# Patient Record
Sex: MALE | Employment: FULL TIME | URBAN - METROPOLITAN AREA
[De-identification: names, ages, dates, MRNs, and addresses within clinical notes are randomized per-mention and may not be internally consistent; named-entity substitution may affect disease eponyms.]

---

## 2017-07-10 PROCEDURE — 87070 CULTURE OTHR SPECIMN AEROBIC: CPT

## 2017-07-10 PROCEDURE — 87147 CULTURE TYPE IMMUNOLOGIC: CPT

## 2017-07-10 PROCEDURE — 87205 SMEAR GRAM STAIN: CPT

## 2017-07-10 PROCEDURE — 87186 SC STD MICRODIL/AGAR DIL: CPT

## 2018-04-30 ENCOUNTER — TELEPHONE (OUTPATIENT)
Dept: FAMILY MEDICINE CLINIC | Facility: CLINIC | Age: 23
End: 2018-04-30

## 2018-04-30 NOTE — TELEPHONE ENCOUNTER
Physical on 5/24/18. Did you want to order labs? [facility]. ...FYI. Bennie Goldman  pt is away at school and will NOT be able to have drawn til afterwards

## 2018-05-21 ENCOUNTER — OFFICE VISIT (OUTPATIENT)
Dept: FAMILY MEDICINE CLINIC | Facility: CLINIC | Age: 23
End: 2018-05-21

## 2018-05-21 VITALS
HEART RATE: 88 BPM | SYSTOLIC BLOOD PRESSURE: 122 MMHG | DIASTOLIC BLOOD PRESSURE: 70 MMHG | HEIGHT: 72 IN | RESPIRATION RATE: 14 BRPM | WEIGHT: 182 LBS | BODY MASS INDEX: 24.65 KG/M2

## 2018-05-21 DIAGNOSIS — Z00.00 ROUTINE GENERAL MEDICAL EXAMINATION AT A HEALTH CARE FACILITY: Primary | ICD-10-CM

## 2018-05-21 DIAGNOSIS — Z00.00 LABORATORY EXAM ORDERED AS PART OF ROUTINE GENERAL MEDICAL EXAMINATION: ICD-10-CM

## 2018-05-21 PROCEDURE — 90471 IMMUNIZATION ADMIN: CPT | Performed by: FAMILY MEDICINE

## 2018-05-21 PROCEDURE — 99395 PREV VISIT EST AGE 18-39: CPT | Performed by: FAMILY MEDICINE

## 2018-05-21 PROCEDURE — 90632 HEPA VACCINE ADULT IM: CPT | Performed by: FAMILY MEDICINE

## 2018-05-21 RX ORDER — BECLOMETHASONE DIPROPIONATE HFA 40 UG/1
AEROSOL, METERED RESPIRATORY (INHALATION)
Refills: 3 | COMMUNITY
Start: 2018-04-25 | End: 2021-11-15

## 2018-05-21 NOTE — PROGRESS NOTES
Juli Shankar is a 20year old male. 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 He did his undergraduate there as well. He has a history of asthma, allergic rhinitis, peanut allergy.   He has an allergist.  He is currently on Qvar, Smokeless tobacco: Never Used    Alcohol use Yes  3.0 - 3.6 oz/week    5 - 6 Standard drinks or equivalent per week         Drug use: No    Sexual activity: Not on file     Other Topics Concern    Caffeine Concern Yes    Comment: 1-2 cups of coffee daily

## 2018-11-23 ENCOUNTER — NURSE ONLY (OUTPATIENT)
Dept: FAMILY MEDICINE CLINIC | Facility: CLINIC | Age: 23
End: 2018-11-23
Payer: COMMERCIAL

## 2018-11-23 VITALS — TEMPERATURE: 99 F

## 2018-11-23 PROCEDURE — 90471 IMMUNIZATION ADMIN: CPT | Performed by: FAMILY MEDICINE

## 2018-11-23 PROCEDURE — 90632 HEPA VACCINE ADULT IM: CPT | Performed by: FAMILY MEDICINE

## 2018-11-23 NOTE — PROGRESS NOTES
Gabriela Morelos presents for his 2nd Hep A vaccine. Hepatitis A immunization given IM in LD. Tolerated well.

## 2019-08-19 ENCOUNTER — OFFICE VISIT (OUTPATIENT)
Dept: FAMILY MEDICINE CLINIC | Facility: CLINIC | Age: 24
End: 2019-08-19
Payer: COMMERCIAL

## 2019-08-19 VITALS
DIASTOLIC BLOOD PRESSURE: 60 MMHG | HEIGHT: 72 IN | BODY MASS INDEX: 19.39 KG/M2 | RESPIRATION RATE: 15 BRPM | WEIGHT: 143.19 LBS | SYSTOLIC BLOOD PRESSURE: 104 MMHG | HEART RATE: 80 BPM

## 2019-08-19 DIAGNOSIS — Z00.00 ROUTINE GENERAL MEDICAL EXAMINATION AT A HEALTH CARE FACILITY: Primary | ICD-10-CM

## 2019-08-19 PROCEDURE — 90471 IMMUNIZATION ADMIN: CPT | Performed by: FAMILY MEDICINE

## 2019-08-19 PROCEDURE — 90715 TDAP VACCINE 7 YRS/> IM: CPT | Performed by: FAMILY MEDICINE

## 2019-08-19 PROCEDURE — 99395 PREV VISIT EST AGE 18-39: CPT | Performed by: FAMILY MEDICINE

## 2019-08-19 NOTE — PROGRESS NOTES
Haydee Bang is a 24year old male. 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 He has a history of asthma, allergic rhinitis, peanut allergy. He sees an allergist.  He is currently on Qvar, Singulair, and levocetirizine.   He has a Bipolar Disorder Sister      Social History    Socioeconomic History      Marital status: Single      Spouse name: Not on file      Number of children: Not on file      Years of education: Not on file      Highest education level: Not on file    Occupation Seat Belt: Not Asked        Self-Exams: Not Asked    Social History Narrative      Not on file      REVIEW OF SYSTEMS:  GENERAL: feels well otherwise  LUNGS: denies shortness of breath with exertion  CARDIOVASCULAR: denies chest pain on exertion  GI: BM re

## 2020-09-03 ENCOUNTER — OFFICE VISIT (OUTPATIENT)
Dept: FAMILY MEDICINE CLINIC | Facility: CLINIC | Age: 25
End: 2020-09-03
Payer: COMMERCIAL

## 2020-09-03 VITALS
HEIGHT: 72.5 IN | DIASTOLIC BLOOD PRESSURE: 70 MMHG | RESPIRATION RATE: 16 BRPM | BODY MASS INDEX: 22.51 KG/M2 | WEIGHT: 168 LBS | HEART RATE: 84 BPM | TEMPERATURE: 98 F | SYSTOLIC BLOOD PRESSURE: 100 MMHG

## 2020-09-03 DIAGNOSIS — Z00.00 ROUTINE GENERAL MEDICAL EXAMINATION AT A HEALTH CARE FACILITY: Primary | ICD-10-CM

## 2020-09-03 DIAGNOSIS — F41.9 ANXIETY: ICD-10-CM

## 2020-09-03 PROCEDURE — 3008F BODY MASS INDEX DOCD: CPT | Performed by: FAMILY MEDICINE

## 2020-09-03 PROCEDURE — 99395 PREV VISIT EST AGE 18-39: CPT | Performed by: FAMILY MEDICINE

## 2020-09-03 PROCEDURE — 3078F DIAST BP <80 MM HG: CPT | Performed by: FAMILY MEDICINE

## 2020-09-03 PROCEDURE — 3074F SYST BP LT 130 MM HG: CPT | Performed by: FAMILY MEDICINE

## 2020-09-03 RX ORDER — ESCITALOPRAM OXALATE 20 MG/1
20 TABLET ORAL DAILY
Qty: 90 TABLET | Refills: 3 | COMMUNITY
Start: 2020-09-03 | End: 2020-11-12

## 2020-09-03 RX ORDER — EPINEPHRINE 0.3 MG/.3ML
INJECTION, SOLUTION INTRAMUSCULAR
COMMUNITY
Start: 2020-08-19

## 2020-09-03 NOTE — PROGRESS NOTES
Helen Young is a 21year old male. Patient graduated from [employer] school at [employer] in 5/2020; he is now preparing for bar exams    He has a history of asthma, allergic rhinitis, peanut allergy.   He sees an allergist.  He is currently on Glokalise, [address] Hypertension Father    • High Cholesterol Father    • Stroke Father    • Other (Other) Father         alcoholic recovered 21+ years   • Lipids Mother    • Depression Sister    • Depression Sister    • Other (lung cancer) Paternal Grandfather    • Heart Dis daily        Occupational Exposure: Not Asked        Hobby Hazards: Not Asked        Sleep Concern: Not Asked        Stress Concern: Not Asked        Weight Concern: Not Asked        Special Diet: Not Asked        Back Care: Not Asked        Exercise:  Yes

## 2020-11-13 RX ORDER — ESCITALOPRAM OXALATE 20 MG/1
20 TABLET ORAL DAILY
Qty: 90 TABLET | Refills: 3 | Status: SHIPPED | OUTPATIENT
Start: 2020-11-13 | End: 2021-11-04

## 2021-02-01 ENCOUNTER — HOSPITAL ENCOUNTER (OUTPATIENT)
Age: 26
Discharge: HOME | End: 2021-02-01
Payer: COMMERCIAL

## 2021-02-01 ENCOUNTER — E-VISIT (OUTPATIENT)
Dept: TELEHEALTH | Age: 26
End: 2021-02-01

## 2021-02-01 VITALS
DIASTOLIC BLOOD PRESSURE: 73 MMHG | OXYGEN SATURATION: 100 % | HEART RATE: 84 BPM | RESPIRATION RATE: 18 BRPM | SYSTOLIC BLOOD PRESSURE: 133 MMHG

## 2021-02-01 DIAGNOSIS — J02.9 SORE THROAT: Primary | ICD-10-CM

## 2021-02-01 DIAGNOSIS — B27.90 INFECTIOUS MONONUCLEOSIS WITHOUT COMPLICATION, INFECTIOUS MONONUCLEOSIS DUE TO UNSPECIFIED ORGANISM: Primary | ICD-10-CM

## 2021-02-01 LAB
POCT MONO: POSITIVE
S PYO AG THROAT QL: NEGATIVE
SARS-COV-2 RNA RESP QL NAA+PROBE: NOT DETECTED

## 2021-02-01 PROCEDURE — 99202 OFFICE O/P NEW SF 15 MIN: CPT | Performed by: NURSE PRACTITIONER

## 2021-02-01 PROCEDURE — 36415 COLL VENOUS BLD VENIPUNCTURE: CPT | Performed by: NURSE PRACTITIONER

## 2021-02-01 PROCEDURE — 87880 STREP A ASSAY W/OPTIC: CPT | Performed by: NURSE PRACTITIONER

## 2021-02-01 PROCEDURE — 86308 HETEROPHILE ANTIBODY SCREEN: CPT | Performed by: NURSE PRACTITIONER

## 2021-02-01 NOTE — PROGRESS NOTES
Aaron Chaudhary is a 31year old male. HPI:   See answers to questions above.      Current Outpatient Medications   Medication Sig Dispense Refill   • GLYCOPYRROLATE 2 MG Oral Tab TAKE 1 TABLET(2 MG) BY MOUTH TWICE DAILY 60 tablet 8   • escitalopram (NIGHAT diagnosis)  Nasal congestion        Meds & Refills for this Visit:  Requested Prescriptions      No prescriptions requested or ordered in this encounter       Duration of  the service:  No charge, sent to [address] [place] [address] for evaluation

## 2021-02-02 NOTE — ED INITIAL ASSESSMENT (HPI)
Pt sent here after evisit for c/o sore throat since Friday. Pt needs strep and covid testing. No known exposure.

## 2021-02-02 NOTE — ED PROVIDER NOTES
Patient Seen in: [facility]    History   CC: sore throat  HPI: Chetan Ella 31year old male  who presents c/o sore throat x3 days. No runny nose, congestion, headache, cough, fever, rash, sob, cp, abd pain, n/v/d, loss of taste/smell.  No Montelukast Sodium 10 MG Oral Tab,  Take 1 tablet (10 mg total) by mouth nightly. Levocetirizine Dihydrochloride 5 MG Oral Tab,  Take 1 tablet (5 mg total) by mouth every evening.    Albuterol Sulfate HFA (PROAIR HFA) 108 (90 BASE) MCG/ACT Inhalation Aero Normal   RAPID SARS-COV-2 BY PCR - Normal       MDM     Rapid strep negative. Monospot ++. I extensively reviewed mononucleosis instructions with patient. Patient's abdomen examined again and is without identifiable splenomegaly.   Mono precautions review

## 2021-11-01 NOTE — TELEPHONE ENCOUNTER
Refill request for:    Requested Prescriptions     Pending Prescriptions Disp Refills   • ESCITALOPRAM 20 MG Oral Tab [Pharmacy Med Name: ESCITALOPRAM 20 MG TABLET] 90 tablet 2     Sig: TAKE 1 TABLET BY MOUTH EVERY DAY        Last Prescribed Quantity Refil

## 2021-11-04 RX ORDER — ESCITALOPRAM OXALATE 20 MG/1
20 TABLET ORAL DAILY
Qty: 90 TABLET | Refills: 0 | Status: SHIPPED | OUTPATIENT
Start: 2021-11-04 | End: 2022-01-25

## 2021-11-15 RX ORDER — ESCITALOPRAM OXALATE 20 MG/1
TABLET ORAL
Qty: 90 TABLET | Refills: 2 | OUTPATIENT
Start: 2021-11-15

## 2021-11-15 RX ORDER — BECLOMETHASONE DIPROPIONATE HFA 80 UG/1
2 AEROSOL, METERED RESPIRATORY (INHALATION) 2 TIMES DAILY
COMMUNITY
Start: 2021-07-22

## 2021-11-15 NOTE — TELEPHONE ENCOUNTER
ESCITALOPRAM 20 MG TABLET          Will file in chart as: ESCITALOPRAM 20 MG Oral Tab    The original prescription was reordered on 11/4/2021 by Neeru Huitron MD. Renewing this prescription may not be appropriate.      Possible duplicate: Hover to review

## 2022-01-25 ENCOUNTER — OFFICE VISIT (OUTPATIENT)
Dept: FAMILY MEDICINE CLINIC | Facility: CLINIC | Age: 27
End: 2022-01-25
Payer: COMMERCIAL

## 2022-01-25 VITALS
DIASTOLIC BLOOD PRESSURE: 68 MMHG | SYSTOLIC BLOOD PRESSURE: 120 MMHG | RESPIRATION RATE: 16 BRPM | HEIGHT: 72 IN | WEIGHT: 217 LBS | TEMPERATURE: 97 F | HEART RATE: 70 BPM | BODY MASS INDEX: 29.39 KG/M2

## 2022-01-25 DIAGNOSIS — Z00.00 ROUTINE GENERAL MEDICAL EXAMINATION AT A HEALTH CARE FACILITY: Primary | ICD-10-CM

## 2022-01-25 PROCEDURE — 3008F BODY MASS INDEX DOCD: CPT | Performed by: FAMILY MEDICINE

## 2022-01-25 PROCEDURE — 99395 PREV VISIT EST AGE 18-39: CPT | Performed by: FAMILY MEDICINE

## 2022-01-25 PROCEDURE — 3074F SYST BP LT 130 MM HG: CPT | Performed by: FAMILY MEDICINE

## 2022-01-25 PROCEDURE — 3078F DIAST BP <80 MM HG: CPT | Performed by: FAMILY MEDICINE

## 2022-01-25 RX ORDER — ESCITALOPRAM OXALATE 20 MG/1
20 TABLET ORAL DAILY
Qty: 90 TABLET | Refills: 3 | Status: SHIPPED | OUTPATIENT
Start: 2022-01-25

## 2022-01-25 NOTE — PROGRESS NOTES
Dominik Encarnacion is a 31year old male. Patient graduated from [employer] school at [employer] in 5/2020; he is now working for a  in [facility]    He has a history of asthma, allergic rhinitis, peanut allergy.   He sees an allergist.  He is currently on Qvar, S evening. 90 tablet 3   • Albuterol Sulfate HFA (PROAIR HFA) 108 (90 BASE) MCG/ACT Inhalation Aero Soln Inhale 2 puffs into the lungs every 6 (six) hours as needed. 1 Inhaler 5   • EPINEPHrine 0.3 MG/0.3ML Injection Device Inject  as directed.        No past Resource Strain: Not on file  Food Insecurity: Not on file  Transportation Needs: Not on file  Physical Activity: Not on file  Stress: Not on file  Social Connections: Not on file  Intimate Partner Violence: Not on file  Housing Stability: Not on file    R

## 2023-01-17 ENCOUNTER — TELEPHONE (OUTPATIENT)
Dept: FAMILY MEDICINE CLINIC | Facility: CLINIC | Age: 28
End: 2023-01-17

## 2023-01-17 NOTE — TELEPHONE ENCOUNTER
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  He has been having persistent vomiting keeping him from participating in the rehab program. She would like him seen as soon as possible front staff made appointment for 1/23/23 with Dr Griffin Calles for this follow up

## 2023-01-17 NOTE — TELEPHONE ENCOUNTER
Dr Devaughn Arambula she is rehab doctor asking to speak to the nurse or dr Tony Joel patient is in treatment center and she needs to discuss patient

## 2023-01-23 ENCOUNTER — OFFICE VISIT (OUTPATIENT)
Dept: FAMILY MEDICINE CLINIC | Facility: CLINIC | Age: 28
End: 2023-01-23
Payer: COMMERCIAL

## 2023-01-23 VITALS
HEART RATE: 60 BPM | SYSTOLIC BLOOD PRESSURE: 124 MMHG | BODY MASS INDEX: 22.37 KG/M2 | HEIGHT: 72 IN | WEIGHT: 165.19 LBS | DIASTOLIC BLOOD PRESSURE: 78 MMHG | RESPIRATION RATE: 16 BRPM | TEMPERATURE: 99 F

## 2023-01-23 DIAGNOSIS — G04.90 VENTRICULITIS OF BRAIN DUE TO BACTERIA: Primary | ICD-10-CM

## 2023-01-23 DIAGNOSIS — G06.0 BRAIN ABSCESS: ICD-10-CM

## 2023-01-23 DIAGNOSIS — B96.89 VENTRICULITIS OF BRAIN DUE TO BACTERIA: Primary | ICD-10-CM

## 2023-01-23 PROBLEM — G91.9 HYDROCEPHALUS (HCC): Status: ACTIVE | Noted: 2022-12-19

## 2023-01-23 PROCEDURE — 3078F DIAST BP <80 MM HG: CPT | Performed by: FAMILY MEDICINE

## 2023-01-23 PROCEDURE — 3008F BODY MASS INDEX DOCD: CPT | Performed by: FAMILY MEDICINE

## 2023-01-23 PROCEDURE — 3074F SYST BP LT 130 MM HG: CPT | Performed by: FAMILY MEDICINE

## 2023-01-23 PROCEDURE — 99214 OFFICE O/P EST MOD 30 MIN: CPT | Performed by: FAMILY MEDICINE

## 2023-01-23 RX ORDER — ESCITALOPRAM OXALATE 20 MG/1
20 TABLET ORAL DAILY
Qty: 90 TABLET | Refills: 3 | Status: SHIPPED | OUTPATIENT
Start: 2023-01-23

## 2023-01-23 RX ORDER — ACETAMINOPHEN 325 MG/1
2 TABLET ORAL EVERY 4 HOURS PRN
COMMUNITY
Start: 2022-12-28

## 2023-04-11 NOTE — TELEPHONE ENCOUNTER
Refill request for:    Requested Prescriptions     Pending Prescriptions Disp Refills   • escitalopram (LEXAPRO) 20 MG Oral Tab 90 tablet 3     Sig: Take 1 tablet (20 mg total) by mouth daily.         Last Prescribed Quantity Refills   11/13/2020 90 3     L Cantharidin Pregnancy And Lactation Text: The use of this medication during pregnancy or lactation is not recommended as there is insufficient data.

## 2024-02-02 ENCOUNTER — TELEPHONE (OUTPATIENT)
Dept: FAMILY MEDICINE CLINIC | Facility: CLINIC | Age: 29
End: 2024-02-02

## 2024-02-02 NOTE — TELEPHONE ENCOUNTER
Refill request for:    Requested Prescriptions     Pending Prescriptions Disp Refills    ESCITALOPRAM 20 MG Oral Tab [Pharmacy Med Name: ESCITALOPRAM 20 MG TABLET] 90 tablet 3     Sig: TAKE 1 TABLET BY MOUTH EVERY DAY        Last Prescribed Quantity Refills   1/23/23 90 3     LOV 1/23/23    Patient was asked to follow-up in: no follow ups on file.    Appointment scheduled: Visit date not found    Medication not on protocol.     Pt overdue for Physical since (1/25/24)  Please assist with scheduling.  Thank you.      Routed to front office.

## 2024-02-14 NOTE — TELEPHONE ENCOUNTER
Refill request for:    Requested Prescriptions     Pending Prescriptions Disp Refills    escitalopram (LEXAPRO) 20 MG Oral Tab 90 tablet 3     Sig: Take 1 tablet (20 mg total) by mouth daily.        Last Prescribed Quantity Refills   1/23/23 90 3     LOV 1/23/23    Patient was asked to follow-up in: no follow ups on file.    Appointment scheduled: 4/20/2024 Marycarmen Watt MD    Medication not on protocol.       Patient comment: I'm not going to be able to come back in until April 20 and I only have three weeks of Lexapro left. Could I get one refill of a smaller number until I'm able to come back in? I'm not a math expert by any means but I think that I'll need 45 more pills. Thank you!       # 90 with 3 refills routed to Any Watt MD for review

## 2024-02-15 RX ORDER — ESCITALOPRAM OXALATE 20 MG/1
20 TABLET ORAL DAILY
Qty: 90 TABLET | Refills: 0 | Status: SHIPPED | OUTPATIENT
Start: 2024-02-15

## 2024-02-19 ENCOUNTER — TELEPHONE (OUTPATIENT)
Dept: FAMILY MEDICINE CLINIC | Facility: CLINIC | Age: 29
End: 2024-02-19

## 2024-02-19 DIAGNOSIS — Z00.00 ROUTINE GENERAL MEDICAL EXAMINATION AT A HEALTH CARE FACILITY: Primary | ICD-10-CM

## 2024-02-19 RX ORDER — ESCITALOPRAM OXALATE 20 MG/1
20 TABLET ORAL DAILY
Qty: 90 TABLET | Refills: 0 | OUTPATIENT
Start: 2024-02-19

## 2024-04-05 LAB
ALBUMIN/GLOBULIN RATIO: 1.5 (CALC) (ref 1–2.5)
ALBUMIN: 4.8 G/DL (ref 3.6–5.1)
ALKALINE PHOSPHATASE: 77 U/L (ref 36–130)
ALT: 65 U/L (ref 9–46)
AST: 41 U/L (ref 10–40)
BILIRUBIN, TOTAL: 0.6 MG/DL (ref 0.2–1.2)
BUN: 11 MG/DL (ref 7–25)
CALCIUM: 9.4 MG/DL (ref 8.6–10.3)
CARBON DIOXIDE: 24 MMOL/L (ref 20–32)
CHLORIDE: 101 MMOL/L (ref 98–110)
CHOL/HDLC RATIO: 4.4 (CALC)
CHOLESTEROL, TOTAL: 175 MG/DL
CREATININE: 1.09 MG/DL (ref 0.6–1.24)
EGFR: 94 ML/MIN/1.73M2
GLOBULIN: 3.1 G/DL (CALC) (ref 1.9–3.7)
GLUCOSE: 83 MG/DL (ref 65–99)
HDL CHOLESTEROL: 40 MG/DL
HEMATOCRIT: 40.9 % (ref 38.5–50)
HEMOGLOBIN: 14.3 G/DL (ref 13.2–17.1)
LDL-CHOLESTEROL: 105 MG/DL (CALC)
MCH: 30 PG (ref 27–33)
MCHC: 35 G/DL (ref 32–36)
MCV: 85.9 FL (ref 80–100)
MPV: 9.6 FL (ref 7.5–12.5)
NON-HDL CHOLESTEROL: 135 MG/DL (CALC)
PLATELET COUNT: 234 THOUSAND/UL (ref 140–400)
POTASSIUM: 4 MMOL/L (ref 3.5–5.3)
PROTEIN, TOTAL: 7.9 G/DL (ref 6.1–8.1)
RDW: 12.9 % (ref 11–15)
RED BLOOD CELL COUNT: 4.76 MILLION/UL (ref 4.2–5.8)
SODIUM: 136 MMOL/L (ref 135–146)
TRIGLYCERIDES: 189 MG/DL
WHITE BLOOD CELL COUNT: 5.5 THOUSAND/UL (ref 3.8–10.8)

## 2024-04-20 ENCOUNTER — OFFICE VISIT (OUTPATIENT)
Dept: FAMILY MEDICINE CLINIC | Facility: CLINIC | Age: 29
End: 2024-04-20
Payer: COMMERCIAL

## 2024-04-20 VITALS
HEART RATE: 70 BPM | HEIGHT: 72 IN | SYSTOLIC BLOOD PRESSURE: 108 MMHG | DIASTOLIC BLOOD PRESSURE: 80 MMHG | BODY MASS INDEX: 26.84 KG/M2 | WEIGHT: 198.19 LBS

## 2024-04-20 DIAGNOSIS — Z00.00 ROUTINE GENERAL MEDICAL EXAMINATION AT A HEALTH CARE FACILITY: Primary | ICD-10-CM

## 2024-04-20 DIAGNOSIS — F41.9 ANXIETY: ICD-10-CM

## 2024-04-20 DIAGNOSIS — R79.89 ELEVATED LFTS: ICD-10-CM

## 2024-04-20 PROCEDURE — 96127 BRIEF EMOTIONAL/BEHAV ASSMT: CPT | Performed by: FAMILY MEDICINE

## 2024-04-20 PROCEDURE — 3074F SYST BP LT 130 MM HG: CPT | Performed by: FAMILY MEDICINE

## 2024-04-20 PROCEDURE — 99395 PREV VISIT EST AGE 18-39: CPT | Performed by: FAMILY MEDICINE

## 2024-04-20 PROCEDURE — 3079F DIAST BP 80-89 MM HG: CPT | Performed by: FAMILY MEDICINE

## 2024-04-20 PROCEDURE — 3008F BODY MASS INDEX DOCD: CPT | Performed by: FAMILY MEDICINE

## 2024-04-20 RX ORDER — ESCITALOPRAM OXALATE 20 MG/1
20 TABLET ORAL DAILY
Qty: 90 TABLET | Refills: 3 | Status: SHIPPED | OUTPATIENT
Start: 2024-04-20

## 2024-04-20 NOTE — PROGRESS NOTES
Orville Tomlinson is a 28 year old male.    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     He has a history of asthma, allergic rhinitis, peanut allergy.  He sees an allergist.  He is currently on Qvar, Singulair, and levocetirizine.    Anxiety:  Lexapro 20mg daily, started at [employer] where he was in law school.  Symptoms are well controlled.    He has a dermatologist at [facility] for acne and hyperhidrosis.  He previously received Botox injections in the axilla for hyperhidrosis.  He is currently on glycopyrrolate.    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  Underlying etiology was not determined.      Tdap:  8/2019  Flu:  Yearly    Dad has history of a stroke, hypertension and hyperlipidemia.  Mom history of hyperlipidemia.    Getting  fall 2025    Wt Readings from Last 6 Encounters:   04/20/24 198 lb 3.2 oz (89.9 kg)   01/23/23 165 lb 3.2 oz (74.9 kg)   01/25/22 217 lb (98.4 kg)   09/03/20 168 lb (76.2 kg)   08/19/19 143 lb 3.2 oz (65 kg)   05/21/18 182 lb (82.6 kg)         Current Outpatient Medications   Medication Sig Dispense Refill    escitalopram (LEXAPRO) 20 MG Oral Tab Take 1 tablet (20 mg total) by mouth daily. 90 tablet 0    acetaminophen 325 MG Oral Tab Take 2 tablets by mouth every 4 (four) hours as needed.      QVAR REDIHALER 80 MCG/ACT Inhalation Aerosol, Breath Activated Inhale 2 puffs into the lungs 2 (two) times daily.      CLINDAMYCIN PHOSPHATE 1 % External Foam APPLY THIN LAYER EXTERNALLY TO THE AFFECTED AREA EVERY  g 11    doxycycline 100 MG Oral Cap 1 po every day with food 90 capsule 4    glycopyrrolate 2 MG Oral Tab Take 1 tablet (2 mg total) by mouth 2 (two) times daily. 180 tablet 4    AUVI-Q 0.3 MG/0.3ML Injection Solution Auto-injector INJECT ONCE AS NEEDED FOR ANAPHYLAXIS       Montelukast Sodium 10 MG Oral Tab Take 1 tablet (10 mg total) by mouth nightly. 90 tablet 3    Levocetirizine Dihydrochloride 5 MG Oral Tab Take 1 tablet (5 mg total) by mouth every evening. 90 tablet 3    Albuterol Sulfate HFA (PROAIR HFA) 108 (90 BASE) MCG/ACT Inhalation Aero Soln Inhale 2 puffs into the lungs every 6 (six) hours as needed. 1 Inhaler 5    EPINEPHrine 0.3 MG/0.3ML Injection Device Inject  as directed.       No past medical history on file.  No past surgical history on file.  Family History   Problem Relation Age of Onset    Hypertension Father     High Cholesterol Father     Stroke Father     Other (Other) Father         alcoholic recovered 20+ years    Lipids Mother     Depression Sister     Depression Sister     Other (lung cancer) Paternal Grandfather     Heart Disease Maternal Grandfather     Heart Disease Maternal Grandmother     Bipolar Disorder Sister      Social History     Socioeconomic History    Marital status: Single     Spouse name: Not on file    Number of children: Not on file    Years of education: Not on file    Highest education level: Not on file   Occupational History    Not on file   Tobacco Use    Smoking status: Never    Smokeless tobacco: Never   Vaping Use    Vaping status: Never Used   Substance and Sexual Activity    Alcohol use: Yes     Alcohol/week: 6.0 - 8.0 standard drinks of alcohol     Types: 6 - 8 Standard drinks or equivalent per week    Drug use: No    Sexual activity: Not on file   Other Topics Concern     Service Not Asked    Blood Transfusions Not Asked    Caffeine Concern Yes     Comment: 1-2 cups of coffee daily    Occupational Exposure Not Asked    Hobby Hazards Not Asked    Sleep Concern Not Asked    Stress Concern Not Asked    Weight Concern Not Asked    Special Diet Not Asked    Back Care Not Asked    Exercise Yes     Comment: trying to go 4x weekly    Bike Helmet Not Asked    Seat Belt Not Asked    Self-Exams Not Asked   Social History Narrative     Not on file     Social Determinants of Health     Financial Resource Strain: Not on file   Food Insecurity: Not on file   Transportation Needs: Not on file   Physical Activity: Not on file   Stress: Not on file   Social Connections: Not on file   Housing Stability: Not on file       REVIEW OF SYSTEMS:  GENERAL: feels well otherwise  LUNGS: denies shortness of breath with exertion  CARDIOVASCULAR: denies chest pain on exertion  GI: BM regular    EXAM:  Ht 6' (1.829 m)   Wt 198 lb 3.2 oz (89.9 kg)   BMI 26.88 kg/m²   GENERAL: well developed, well nourished,in no apparent distress  HEAD:  Normocephalic, atraumatic  H EENT: TMs normal  NECK:  Supple, no thyromegaly  HEART:  RRR, no MRGs, pedal pulses 2/4 bilaterally, no LE edema  LUNG:  Normal respiratory effort, CTA bilaterally, no RRWs  AB:  Soft, nontender, nondistended.  No palpable masses, No HSM  PSYCH:  Mood and affect normal    ASSESSMENT AND PLAN:  Orville Tomlinson is a 28 year old male.  1. Routine general medical examination at a health care facility    2. Anxiety    3. Elevated LFTs  - Hepatic Function Panel (7) [E]    Repeat HFP in 2 months  Flu shot annually  Lexapro refilled  Recommend regular exercise and low-fat diet.   Follow-up annually or before as needed.    No orders of the defined types were placed in this encounter.      Meds & Refills for this Visit:  Requested Prescriptions      No prescriptions requested or ordered in this encounter       Imaging & Consults:  None

## 2025-04-02 ENCOUNTER — TELEPHONE (OUTPATIENT)
Dept: FAMILY MEDICINE CLINIC | Facility: CLINIC | Age: 30
End: 2025-04-02

## 2025-04-29 RX ORDER — ESCITALOPRAM OXALATE 20 MG/1
20 TABLET ORAL DAILY
Qty: 90 TABLET | Refills: 0 | Status: SHIPPED | OUTPATIENT
Start: 2025-04-29

## 2025-04-29 NOTE — TELEPHONE ENCOUNTER
Refill request for:    Requested Prescriptions     Pending Prescriptions Disp Refills    ESCITALOPRAM 20 MG Oral Tab [Pharmacy Med Name: ESCITALOPRAM 20 MG TABLET] 90 tablet 3     Sig: TAKE 1 TABLET BY MOUTH EVERY DAY        Last Prescribed Quantity Refills   4/20/24 90 3     LOV 4/20/24    Patient was asked to follow-up in: 1 year    Appointment due: April 2025    Appointment scheduled: 7/8/2025 Marycarmen Watt MD    Medication not on protocol.     # 90 with 3 refills routed to Any Watt MD for review

## 2025-06-17 ENCOUNTER — TELEPHONE (OUTPATIENT)
Dept: FAMILY MEDICINE CLINIC | Facility: CLINIC | Age: 30
End: 2025-06-17

## 2025-06-17 DIAGNOSIS — Z00.00 ROUTINE GENERAL MEDICAL EXAMINATION AT A HEALTH CARE FACILITY: Primary | ICD-10-CM

## 2025-06-17 NOTE — TELEPHONE ENCOUNTER
Please enter lab orders for the patient's upcoming physical appointment.     Physical scheduled:   Your appointments       Date & Time Appointment Department ([place])    Jul 08, 2025 12:30 PM CDT Adult Physical with Marycarmen Watt MD [facility]    PLEASE NOTE - Most insurances allow a Complete Physical once every 366 days. Please schedule accordingly.    Please arrive 15 minutes prior to your scheduled appointment. Please also bring your Insurance card, Photo ID, and your medication bottles or a list of your current medication.    If you no longer require this appointment, please contact your physician office to cancel.              [facility]  [address] [id number] [phone]           Preferred lab: [facility]     The patient has been notified to complete fasting labs prior to their physical appointment.

## 2025-06-27 LAB
ALBUMIN/GLOBULIN RATIO: 2.1 (CALC) (ref 1–2.5)
ALBUMIN: 5.1 G/DL (ref 3.6–5.1)
ALKALINE PHOSPHATASE: 82 U/L (ref 36–130)
ALT: 26 U/L (ref 9–46)
AST: 26 U/L (ref 10–40)
BILIRUBIN, TOTAL: 0.7 MG/DL (ref 0.2–1.2)
BUN/CREATININE RATIO: 6 (CALC) (ref 6–22)
BUN: 6 MG/DL (ref 7–25)
CALCIUM: 9.4 MG/DL (ref 8.6–10.3)
CARBON DIOXIDE: 24 MMOL/L (ref 20–32)
CHLORIDE: 102 MMOL/L (ref 98–110)
CHOL/HDLC RATIO: 3.3 (CALC)
CHOLESTEROL, TOTAL: 166 MG/DL
CREATININE: 1.01 MG/DL (ref 0.6–1.26)
EGFR: 103 ML/MIN/1.73M2
GLOBULIN: 2.4 G/DL (CALC) (ref 1.9–3.7)
GLUCOSE: 86 MG/DL (ref 65–99)
HDL CHOLESTEROL: 50 MG/DL
HEMATOCRIT: 43.4 % (ref 38.5–50)
HEMOGLOBIN: 14.5 G/DL (ref 13.2–17.1)
LDL-CHOLESTEROL: 91 MG/DL (CALC)
MCH: 30.5 PG (ref 27–33)
MCHC: 33.4 G/DL (ref 32–36)
MCV: 91.2 FL (ref 80–100)
MPV: 9.5 FL (ref 7.5–12.5)
NON-HDL CHOLESTEROL: 116 MG/DL (CALC)
PLATELET COUNT: 205 THOUSAND/UL (ref 140–400)
POTASSIUM: 4.2 MMOL/L (ref 3.5–5.3)
PROTEIN, TOTAL: 7.5 G/DL (ref 6.1–8.1)
RDW: 12.5 % (ref 11–15)
RED BLOOD CELL COUNT: 4.76 MILLION/UL (ref 4.2–5.8)
SODIUM: 135 MMOL/L (ref 135–146)
TRIGLYCERIDES: 150 MG/DL
WHITE BLOOD CELL COUNT: 4.3 THOUSAND/UL (ref 3.8–10.8)

## 2025-07-08 ENCOUNTER — OFFICE VISIT (OUTPATIENT)
Dept: FAMILY MEDICINE CLINIC | Facility: CLINIC | Age: 30
End: 2025-07-08
Payer: COMMERCIAL

## 2025-07-08 VITALS
RESPIRATION RATE: 16 BRPM | BODY MASS INDEX: 26.98 KG/M2 | DIASTOLIC BLOOD PRESSURE: 70 MMHG | SYSTOLIC BLOOD PRESSURE: 104 MMHG | HEIGHT: 72 IN | HEART RATE: 56 BPM | TEMPERATURE: 98 F | WEIGHT: 199.19 LBS

## 2025-07-08 DIAGNOSIS — Z00.00 ROUTINE GENERAL MEDICAL EXAMINATION AT A HEALTH CARE FACILITY: Primary | ICD-10-CM

## 2025-07-08 PROCEDURE — 3078F DIAST BP <80 MM HG: CPT | Performed by: FAMILY MEDICINE

## 2025-07-08 PROCEDURE — 3074F SYST BP LT 130 MM HG: CPT | Performed by: FAMILY MEDICINE

## 2025-07-08 PROCEDURE — 99395 PREV VISIT EST AGE 18-39: CPT | Performed by: FAMILY MEDICINE

## 2025-07-08 PROCEDURE — 3008F BODY MASS INDEX DOCD: CPT | Performed by: FAMILY MEDICINE

## 2025-07-08 RX ORDER — BUDESONIDE 180 UG/1
1 AEROSOL, POWDER RESPIRATORY (INHALATION) 2 TIMES DAILY
COMMUNITY
Start: 2025-01-21

## 2025-07-08 RX ORDER — ESCITALOPRAM OXALATE 10 MG/1
10 TABLET ORAL DAILY
Qty: 30 TABLET | Refills: 1 | Status: SHIPPED | OUTPATIENT
Start: 2025-07-08

## 2025-07-08 NOTE — PROGRESS NOTES
Orville Tomlinson is a 29 year old male.    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     He has a history of asthma, allergic rhinitis, peanut allergy.  He sees an allergist.  He is currently on pulmicort, Singulair, and levocetirizine.    Anxiety:  Lexapro 20mg daily, started at [employer] where he was in law school.  Symptoms are well controlled at this time.  Having some ED, interested in reducing dose or stopping altogether.     He has a dermatologist at [facility] for acne and hyperhidrosis.  He previously received Botox injections in the axilla for hyperhidrosis.  He is currently on glycopyrrolate.    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  Underlying etiology was not determined.      Tdap:  8/2019  Flu:  Yearly    Getting  fall 2025    Wt Readings from Last 6 Encounters:   07/08/25 199 lb 3.2 oz (90.4 kg)   04/20/24 198 lb 3.2 oz (89.9 kg)   01/23/23 165 lb 3.2 oz (74.9 kg)   01/25/22 217 lb (98.4 kg)   09/03/20 168 lb (76.2 kg)   08/19/19 143 lb 3.2 oz (65 kg)         Current Outpatient Medications   Medication Sig Dispense Refill    PULMICORT FLEXHALER 180 MCG/ACT Inhalation Aerosol Powder, Breath Activated Inhale 1 puff into the lungs 2 (two) times daily.      escitalopram 10 MG Oral Tab Take 1 tablet (10 mg total) by mouth daily. 30 tablet 1    acetaminophen 325 MG Oral Tab Take 2 tablets (650 mg total) by mouth every 4 (four) hours as needed.      Montelukast Sodium 10 MG Oral Tab Take 1 tablet (10 mg total) by mouth nightly. 90 tablet 3    Levocetirizine Dihydrochloride 5 MG Oral Tab Take 1 tablet (5 mg total) by mouth every evening. 90 tablet 3    Albuterol Sulfate HFA (PROAIR HFA) 108 (90 BASE) MCG/ACT Inhalation Aero Soln Inhale 2 puffs into the lungs every 6 (six) hours as needed. 1 Inhaler  5    EPINEPHrine 0.3 MG/0.3ML Injection Device Inject  as directed.      QVAR REDIHALER 80 MCG/ACT Inhalation Aerosol, Breath Activated Inhale 2 puffs into the lungs 2 (two) times daily.       No past medical history on file.  No past surgical history on file.  Family History   Problem Relation Age of Onset    Hypertension Father     High Cholesterol Father     Stroke Father     Other (Other) Father         alcoholic recovered 20+ years    Lipids Mother     Depression Sister     Depression Sister     Other (lung cancer) Paternal Grandfather     Heart Disease Maternal Grandfather     Heart Disease Maternal Grandmother     Bipolar Disorder Sister      Social History     Socioeconomic History    Marital status: Single     Spouse name: Not on file    Number of children: Not on file    Years of education: Not on file    Highest education level: Not on file   Occupational History    Not on file   Tobacco Use    Smoking status: Never    Smokeless tobacco: Never   Vaping Use    Vaping status: Never Used   Substance and Sexual Activity    Alcohol use: Yes     Alcohol/week: 6.0 - 8.0 standard drinks of alcohol     Types: 6 - 8 Standard drinks or equivalent per week     Comment: weekends    Drug use: No    Sexual activity: Not on file   Other Topics Concern     Service Not Asked    Blood Transfusions Not Asked    Caffeine Concern Yes     Comment: 3-4 cups of coffee daily    Occupational Exposure Not Asked    Hobby Hazards Not Asked    Sleep Concern Not Asked    Stress Concern Not Asked    Weight Concern Not Asked    Special Diet Not Asked    Back Care Not Asked    Exercise Yes     Comment: 3-4 week    Bike Helmet Not Asked    Seat Belt Not Asked    Self-Exams Not Asked   Social History Narrative    Not on file     Social Drivers of Health     Food Insecurity: Not on file   Transportation Needs: Not on file   Stress: Not on file   Housing Stability: Not on file       REVIEW OF SYSTEMS:  GENERAL: feels well  otherwise  LUNGS: denies shortness of breath with exertion  CARDIOVASCULAR: denies chest pain on exertion  GI: BM regular    EXAM:  /70   Pulse 56   Temp 97.7 °F (36.5 °C)   Resp 16   Ht 6' (1.829 m)   Wt 199 lb 3.2 oz (90.4 kg)   BMI 27.02 kg/m²   GENERAL: well developed, well nourished,in no apparent distress  HEAD:  Normocephalic, atraumatic  H EENT: TMs normal  NECK:  Supple, no thyromegaly  HEART:  RRR, no MRGs, pedal pulses 2/4 bilaterally, no LE edema  LUNG:  Normal respiratory effort, CTA bilaterally, no RRWs  AB:  Soft, nontender, nondistended.  No palpable masses, No HSM  PSYCH:  Mood and affect normal    ASSESSMENT AND PLAN:  Orville Tomlinson is a 29 year old male.  1. Routine general medical examination at a health care facility    2. Anxiety    Flu shot annually  Reduce lexapro to 10mg daily, can continue to taper to 5mg and off if tolerated.   Recommend regular exercise and low-fat diet.   Follow-up annually or before as needed.    No orders of the defined types were placed in this encounter.      Meds & Refills for this Visit:  Requested Prescriptions     Signed Prescriptions Disp Refills    escitalopram 10 MG Oral Tab 30 tablet 1     Sig: Take 1 tablet (10 mg total) by mouth daily.       Imaging & Consults:  None

## 2025-07-25 RX ORDER — ESCITALOPRAM OXALATE 20 MG/1
20 TABLET ORAL DAILY
Qty: 90 TABLET | Refills: 0 | OUTPATIENT
Start: 2025-07-25

## 2025-07-25 NOTE — TELEPHONE ENCOUNTER
Per 7/8/25 office visit with Dr. Watt:  Reduce lexapro to 10mg daily, can continue to taper to 5mg and off if tolerated.

## 2025-08-08 RX ORDER — ESCITALOPRAM OXALATE 10 MG/1
10 TABLET ORAL DAILY
Qty: 90 TABLET | Refills: 1 | OUTPATIENT
Start: 2025-08-08

## (undated) NOTE — LETTER
ASTHMA ACTION PLAN for Juli Shankar     : 1995     Date: 2018  Provider:  Bhargav Hayward MD  Phone for doctor or clinic: [address] [facility], [address] [facility], [address] [address] [address] ,[address], [address]  [facility] [address] [address]

## (undated) NOTE — LETTER
ASTHMA ACTION PLAN for Juli Shankar     : 1995     Date: 9/3/2020  Provider:  Bhargav Hayward MD  Phone for doctor or clinic: [address] [place] [address], [address] [place] [address] [phone]